# Patient Record
Sex: FEMALE | Race: WHITE | Employment: FULL TIME | URBAN - METROPOLITAN AREA
[De-identification: names, ages, dates, MRNs, and addresses within clinical notes are randomized per-mention and may not be internally consistent; named-entity substitution may affect disease eponyms.]

---

## 2019-02-26 ENCOUNTER — HOSPITAL ENCOUNTER (OUTPATIENT)
Age: 34
Discharge: HOME | End: 2019-02-26

## 2019-02-26 LAB — RUBV IGG SER QL: 453.4 IU/ML

## 2019-02-26 PROCEDURE — 86787 VARICELLA-ZOSTER ANTIBODY: CPT

## 2019-02-26 PROCEDURE — 86762 RUBELLA ANTIBODY: CPT

## 2019-02-26 PROCEDURE — 86481 TB AG RESPONSE T-CELL SUSP: CPT

## 2019-02-26 PROCEDURE — 86765 RUBEOLA ANTIBODY: CPT

## 2019-02-26 PROCEDURE — 86735 MUMPS ANTIBODY: CPT

## 2019-02-27 LAB
MEASLES IMMUNE (IGG): 2.64
MUV IGG SER QL: 2.58
VZV IGG SER QL IA: 4.54

## 2019-03-05 LAB — T-SPOT TB TEST: NORMAL

## 2019-04-11 ENCOUNTER — EMPLOYEE WELLNESS (OUTPATIENT)
Dept: OTHER | Age: 34
End: 2019-04-11

## 2019-04-11 LAB
CHOLESTEROL/HDL RATIO: 3.7
CHOLESTEROL: 171 MG/DL
ESTIMATED AVERAGE GLUCOSE: 94 MG/DL
GLUCOSE BLD-MCNC: 151 MG/DL (ref 70–99)
HBA1C MFR BLD: 4.9 % (ref 4–6)
HDLC SERPL-MCNC: 46 MG/DL
LDL CHOLESTEROL: 106 MG/DL (ref 0–130)
PATIENT FASTING?: NO
TRIGL SERPL-MCNC: 94 MG/DL
VLDLC SERPL CALC-MCNC: ABNORMAL MG/DL (ref 1–30)

## 2019-05-06 VITALS — WEIGHT: 157 LBS

## 2019-10-08 ENCOUNTER — OFFICE VISIT (OUTPATIENT)
Dept: FAMILY MEDICINE CLINIC | Age: 34
End: 2019-10-08
Payer: COMMERCIAL

## 2019-10-08 VITALS
HEART RATE: 91 BPM | HEIGHT: 64 IN | RESPIRATION RATE: 16 BRPM | SYSTOLIC BLOOD PRESSURE: 125 MMHG | BODY MASS INDEX: 28.48 KG/M2 | DIASTOLIC BLOOD PRESSURE: 79 MMHG | OXYGEN SATURATION: 99 % | WEIGHT: 166.8 LBS

## 2019-10-08 DIAGNOSIS — Z23 NEED FOR INFLUENZA VACCINATION: ICD-10-CM

## 2019-10-08 DIAGNOSIS — E55.9 VITAMIN D DEFICIENCY: ICD-10-CM

## 2019-10-08 DIAGNOSIS — Z00.00 WELL ADULT EXAM: Primary | ICD-10-CM

## 2019-10-08 DIAGNOSIS — Z98.84 HISTORY OF GASTRIC BYPASS: ICD-10-CM

## 2019-10-08 DIAGNOSIS — R41.840 CONCENTRATION DEFICIT: ICD-10-CM

## 2019-10-08 PROCEDURE — 90686 IIV4 VACC NO PRSV 0.5 ML IM: CPT | Performed by: FAMILY MEDICINE

## 2019-10-08 PROCEDURE — 99385 PREV VISIT NEW AGE 18-39: CPT | Performed by: FAMILY MEDICINE

## 2019-10-08 PROCEDURE — 90471 IMMUNIZATION ADMIN: CPT | Performed by: FAMILY MEDICINE

## 2019-10-08 RX ORDER — VILAZODONE HYDROCHLORIDE 40 MG/1
40 TABLET ORAL DAILY
COMMUNITY

## 2019-10-08 RX ORDER — OMEPRAZOLE 20 MG/1
40 CAPSULE, DELAYED RELEASE ORAL DAILY
COMMUNITY

## 2019-10-08 RX ORDER — BUPROPION HYDROCHLORIDE 100 MG/1
100 TABLET ORAL 2 TIMES DAILY
COMMUNITY
End: 2020-10-30 | Stop reason: DRUGHIGH

## 2019-10-08 RX ORDER — LORATADINE 10 MG/1
10 TABLET ORAL DAILY
COMMUNITY
End: 2020-03-06 | Stop reason: CLARIF

## 2019-10-08 ASSESSMENT — PATIENT HEALTH QUESTIONNAIRE - PHQ9
SUM OF ALL RESPONSES TO PHQ9 QUESTIONS 1 & 2: 0
2. FEELING DOWN, DEPRESSED OR HOPELESS: 0
SUM OF ALL RESPONSES TO PHQ QUESTIONS 1-9: 0
SUM OF ALL RESPONSES TO PHQ QUESTIONS 1-9: 0
1. LITTLE INTEREST OR PLEASURE IN DOING THINGS: 0

## 2019-11-04 ENCOUNTER — TELEPHONE (OUTPATIENT)
Dept: FAMILY MEDICINE CLINIC | Age: 34
End: 2019-11-04

## 2019-11-04 DIAGNOSIS — R30.0 DYSURIA: Primary | ICD-10-CM

## 2019-11-05 ENCOUNTER — HOSPITAL ENCOUNTER (OUTPATIENT)
Age: 34
Setting detail: SPECIMEN
Discharge: HOME | End: 2019-11-05
Payer: COMMERCIAL

## 2019-11-05 ENCOUNTER — HOSPITAL ENCOUNTER (OUTPATIENT)
Age: 34
Discharge: HOME | End: 2019-11-05
Payer: COMMERCIAL

## 2019-11-05 DIAGNOSIS — R30.0 DYSURIA: ICD-10-CM

## 2019-11-05 DIAGNOSIS — E55.9 VITAMIN D DEFICIENCY: ICD-10-CM

## 2019-11-05 DIAGNOSIS — Z00.00 WELL ADULT EXAM: ICD-10-CM

## 2019-11-05 DIAGNOSIS — Z98.84 HISTORY OF GASTRIC BYPASS: ICD-10-CM

## 2019-11-05 LAB
-: NORMAL
ABSOLUTE EOS #: 0.18 K/UL (ref 0–0.44)
ABSOLUTE IMMATURE GRANULOCYTE: <0.03 K/UL (ref 0–0.3)
ABSOLUTE LYMPH #: 2.5 K/UL (ref 1.1–3.7)
ABSOLUTE MONO #: 0.3 K/UL (ref 0.1–1.2)
ALBUMIN SERPL-MCNC: 4.4 G/DL (ref 3.5–5.2)
ALBUMIN/GLOBULIN RATIO: 1.6 (ref 1–2.5)
ALP BLD-CCNC: 96 U/L (ref 35–104)
ALT SERPL-CCNC: 17 U/L (ref 5–33)
AMORPHOUS: NORMAL
ANION GAP SERPL CALCULATED.3IONS-SCNC: 9 MMOL/L (ref 9–17)
AST SERPL-CCNC: 18 U/L
BACTERIA: NORMAL
BASOPHILS # BLD: 1 % (ref 0–2)
BASOPHILS ABSOLUTE: 0.03 K/UL (ref 0–0.2)
BILIRUB SERPL-MCNC: 0.21 MG/DL (ref 0.3–1.2)
BILIRUBIN URINE: NEGATIVE
BUN BLDV-MCNC: 11 MG/DL (ref 6–20)
BUN/CREAT BLD: ABNORMAL (ref 9–20)
CALCIUM IONIZED: 1.22 MMOL/L (ref 1.13–1.33)
CALCIUM SERPL-MCNC: 8.8 MG/DL (ref 8.6–10.4)
CASTS UA: NORMAL /LPF (ref 0–8)
CHLORIDE BLD-SCNC: 107 MMOL/L (ref 98–107)
CHOLESTEROL/HDL RATIO: 3
CHOLESTEROL: 169 MG/DL
CO2: 26 MMOL/L (ref 20–31)
COLOR: ABNORMAL
COMMENT UA: ABNORMAL
CREAT SERPL-MCNC: 0.65 MG/DL (ref 0.5–0.9)
CRYSTALS, UA: NORMAL /HPF
DIFFERENTIAL TYPE: ABNORMAL
EOSINOPHILS RELATIVE PERCENT: 4 % (ref 1–4)
EPITHELIAL CELLS UA: NORMAL /HPF (ref 0–5)
FOLATE: 12 NG/ML
GFR AFRICAN AMERICAN: >60 ML/MIN
GFR NON-AFRICAN AMERICAN: >60 ML/MIN
GFR SERPL CREATININE-BSD FRML MDRD: ABNORMAL ML/MIN/{1.73_M2}
GFR SERPL CREATININE-BSD FRML MDRD: ABNORMAL ML/MIN/{1.73_M2}
GLUCOSE BLD-MCNC: 85 MG/DL (ref 70–99)
GLUCOSE URINE: NEGATIVE
HCT VFR BLD CALC: 38.4 % (ref 36.3–47.1)
HDLC SERPL-MCNC: 56 MG/DL
HEMOGLOBIN: 12 G/DL (ref 11.9–15.1)
IMMATURE GRANULOCYTES: 0 %
IRON SATURATION: 9 % (ref 20–55)
IRON: 37 UG/DL (ref 37–145)
KETONES, URINE: NEGATIVE
LDL CHOLESTEROL: 97 MG/DL (ref 0–130)
LEUKOCYTE ESTERASE, URINE: NEGATIVE
LYMPHOCYTES # BLD: 48 % (ref 24–43)
MCH RBC QN AUTO: 26.2 PG (ref 25.2–33.5)
MCHC RBC AUTO-ENTMCNC: 31.3 G/DL (ref 28.4–34.8)
MCV RBC AUTO: 83.8 FL (ref 82.6–102.9)
MONOCYTES # BLD: 6 % (ref 3–12)
MUCUS: NORMAL
NITRITE, URINE: POSITIVE
NRBC AUTOMATED: 0 PER 100 WBC
OTHER OBSERVATIONS UA: NORMAL
PDW BLD-RTO: 12 % (ref 11.8–14.4)
PH UA: 7.5 (ref 5–8)
PHOSPHORUS: 3.3 MG/DL (ref 2.6–4.5)
PLATELET # BLD: 371 K/UL (ref 138–453)
PLATELET ESTIMATE: ABNORMAL
PMV BLD AUTO: 10.2 FL (ref 8.1–13.5)
POTASSIUM SERPL-SCNC: 3.8 MMOL/L (ref 3.7–5.3)
PROTEIN UA: NEGATIVE
RBC # BLD: 4.58 M/UL (ref 3.95–5.11)
RBC # BLD: ABNORMAL 10*6/UL
RBC UA: NORMAL /HPF (ref 0–4)
RENAL EPITHELIAL, UA: NORMAL /HPF
SEG NEUTROPHILS: 42 % (ref 36–65)
SEGMENTED NEUTROPHILS ABSOLUTE COUNT: 2.17 K/UL (ref 1.5–8.1)
SODIUM BLD-SCNC: 142 MMOL/L (ref 135–144)
SPECIFIC GRAVITY UA: 1.02 (ref 1–1.03)
THYROXINE, FREE: 1.02 NG/DL (ref 0.93–1.7)
TOTAL IRON BINDING CAPACITY: 399 UG/DL (ref 250–450)
TOTAL PROTEIN: 7.2 G/DL (ref 6.4–8.3)
TRICHOMONAS: NORMAL
TRIGL SERPL-MCNC: 78 MG/DL
TSH SERPL DL<=0.05 MIU/L-ACNC: 1.07 MIU/L (ref 0.3–5)
TURBIDITY: CLEAR
UNSATURATED IRON BINDING CAPACITY: 362 UG/DL (ref 112–347)
URINE HGB: NEGATIVE
UROBILINOGEN, URINE: NORMAL
VITAMIN B-12: 629 PG/ML (ref 232–1245)
VITAMIN D 25-HYDROXY: 18 NG/ML (ref 30–100)
VLDLC SERPL CALC-MCNC: NORMAL MG/DL (ref 1–30)
WBC # BLD: 5.2 K/UL (ref 3.5–11.3)
WBC # BLD: ABNORMAL 10*3/UL
WBC UA: NORMAL /HPF (ref 0–5)
YEAST: NORMAL

## 2019-11-05 PROCEDURE — 86376 MICROSOMAL ANTIBODY EACH: CPT

## 2019-11-05 PROCEDURE — 84443 ASSAY THYROID STIM HORMONE: CPT

## 2019-11-05 PROCEDURE — 82607 VITAMIN B-12: CPT

## 2019-11-05 PROCEDURE — 84439 ASSAY OF FREE THYROXINE: CPT

## 2019-11-05 PROCEDURE — 82746 ASSAY OF FOLIC ACID SERUM: CPT

## 2019-11-05 PROCEDURE — 80053 COMPREHEN METABOLIC PANEL: CPT

## 2019-11-05 PROCEDURE — 82306 VITAMIN D 25 HYDROXY: CPT

## 2019-11-05 PROCEDURE — 82330 ASSAY OF CALCIUM: CPT

## 2019-11-05 PROCEDURE — 83550 IRON BINDING TEST: CPT

## 2019-11-05 PROCEDURE — 36415 COLL VENOUS BLD VENIPUNCTURE: CPT

## 2019-11-05 PROCEDURE — 80061 LIPID PANEL: CPT

## 2019-11-05 PROCEDURE — 83540 ASSAY OF IRON: CPT

## 2019-11-05 PROCEDURE — 84425 ASSAY OF VITAMIN B-1: CPT

## 2019-11-05 PROCEDURE — 85025 COMPLETE CBC W/AUTO DIFF WBC: CPT

## 2019-11-05 PROCEDURE — 84100 ASSAY OF PHOSPHORUS: CPT

## 2019-11-06 LAB
CULTURE: NORMAL
Lab: NORMAL
SPECIMEN DESCRIPTION: NORMAL
THYROID PEROXIDASE (TPO) AB: 147 IU/ML (ref 0–35)

## 2019-11-08 LAB — VITAMIN B1 WHOLE BLOOD: 118 NMOL/L (ref 70–180)

## 2019-11-18 ENCOUNTER — HOSPITAL ENCOUNTER (EMERGENCY)
Age: 34
Discharge: HOME | End: 2019-11-18
Attending: EMERGENCY MEDICINE
Payer: COMMERCIAL

## 2019-11-18 VITALS
TEMPERATURE: 98.4 F | OXYGEN SATURATION: 100 % | HEART RATE: 94 BPM | SYSTOLIC BLOOD PRESSURE: 150 MMHG | RESPIRATION RATE: 18 BRPM | DIASTOLIC BLOOD PRESSURE: 108 MMHG

## 2019-11-18 DIAGNOSIS — Z77.21 EXPOSURE TO POTENTIALLY HAZARDOUS BODY FLUIDS: Primary | ICD-10-CM

## 2019-11-18 PROCEDURE — 99282 EMERGENCY DEPT VISIT SF MDM: CPT

## 2019-11-18 ASSESSMENT — ENCOUNTER SYMPTOMS
VOMITING: 0
NAUSEA: 0
SHORTNESS OF BREATH: 0
RHINORRHEA: 0
ABDOMINAL PAIN: 0
COUGH: 0
BACK PAIN: 0

## 2019-12-02 ENCOUNTER — TELEPHONE (OUTPATIENT)
Dept: FAMILY MEDICINE CLINIC | Age: 34
End: 2019-12-02

## 2019-12-02 NOTE — TELEPHONE ENCOUNTER
I called Dr. Augustus Bass they need a consent form signed by patient in order to release records. Patient will be here tomorrow to sign forms.

## 2020-01-09 ENCOUNTER — OFFICE VISIT (OUTPATIENT)
Dept: FAMILY MEDICINE CLINIC | Age: 35
End: 2020-01-09
Payer: COMMERCIAL

## 2020-01-09 ENCOUNTER — HOSPITAL ENCOUNTER (OUTPATIENT)
Age: 35
Setting detail: SPECIMEN
Discharge: HOME | End: 2020-01-09
Payer: COMMERCIAL

## 2020-01-09 ENCOUNTER — NURSE TRIAGE (OUTPATIENT)
Dept: OTHER | Facility: CLINIC | Age: 35
End: 2020-01-09

## 2020-01-09 VITALS
SYSTOLIC BLOOD PRESSURE: 160 MMHG | BODY MASS INDEX: 26.64 KG/M2 | DIASTOLIC BLOOD PRESSURE: 106 MMHG | RESPIRATION RATE: 16 BRPM | HEART RATE: 94 BPM | OXYGEN SATURATION: 100 % | WEIGHT: 155.2 LBS

## 2020-01-09 LAB
BILIRUBIN, POC: NORMAL
BLOOD URINE, POC: NORMAL
CLARITY, POC: CLEAR
COLOR, POC: YELLOW
GLUCOSE URINE, POC: NORMAL
KETONES, POC: NORMAL
LEUKOCYTE EST, POC: NORMAL
NITRITE, POC: NORMAL
PH, POC: 7
PROTEIN, POC: NORMAL
SPECIFIC GRAVITY, POC: 1.02
UROBILINOGEN, POC: 0.2

## 2020-01-09 PROCEDURE — 99213 OFFICE O/P EST LOW 20 MIN: CPT | Performed by: FAMILY MEDICINE

## 2020-01-09 PROCEDURE — 81003 URINALYSIS AUTO W/O SCOPE: CPT | Performed by: FAMILY MEDICINE

## 2020-01-09 RX ORDER — METOPROLOL SUCCINATE 50 MG/1
50 TABLET, EXTENDED RELEASE ORAL DAILY
Qty: 30 TABLET | Refills: 0 | Status: SHIPPED
Start: 2020-01-09 | End: 2020-03-06 | Stop reason: CLARIF

## 2020-01-09 ASSESSMENT — PATIENT HEALTH QUESTIONNAIRE - PHQ9
SUM OF ALL RESPONSES TO PHQ9 QUESTIONS 1 & 2: 0
1. LITTLE INTEREST OR PLEASURE IN DOING THINGS: 0
SUM OF ALL RESPONSES TO PHQ QUESTIONS 1-9: 0
SUM OF ALL RESPONSES TO PHQ QUESTIONS 1-9: 0
2. FEELING DOWN, DEPRESSED OR HOPELESS: 0

## 2020-01-09 NOTE — PROGRESS NOTES
Right Ear: External ear normal. Tympanic membrane is not erythematous. No middle ear effusion. Left Ear: External ear normal. Tympanic membrane is not erythematous. No middle ear effusion. Nose: No mucosal edema. Mouth/Throat: Oropharynx is clear and moist. No posterior oropharyngeal erythema. Eyes: Conjunctivae and EOM are normal. Pupils are equal, round, and reactive to light. Neck: Normal range of motion. Neck supple. No thyromegaly present. Cardiovascular: Normal rate, regular rhythm and normal heart sounds. No murmur heard. Pulmonary/Chest: Effort normal and breath sounds normal. She has no wheezes. Shehas no rales. Abdominal: Soft. Bowel sounds are normal. She exhibits no distension and no mass. There is no tenderness. There is no rebound and no guarding. Genitourinary/Anorectal:deferred  Musculoskeletal: Normal range of motion. She exhibits no edema or tenderness. Lymphadenopathy: She has no cervical adenopathy. Neurological: She is alert and oriented to person, place, and time. She has normal reflexes. Skin: Skin is warm and dry. No rash noted. Psychiatric: She has a normal mood and affect. Her   behavior is normal.       Assessment:      1. Dysuria    2. Essential hypertension          Plan:      Call or return to clinic prn if these symptoms worsen or fail to improve as anticipated. I have reviewed the instructions with the patient, answering all questions to her satisfaction. No follow-ups on file. Orders Placed This Encounter   Procedures    Urine Culture     Standing Status:   Future     Standing Expiration Date:   1/9/2021     Order Specific Question:   Specify (ex-cath, midstream, cysto, etc)?      Answer:   Mid Stream    POCT Urinalysis No Micro (Auto)     Orders Placed This Encounter   Medications    metoprolol succinate (TOPROL XL) 50 MG extended release tablet     Sig: Take 1 tablet by mouth daily     Dispense:  30 tablet     Refill:  0     I discussed with the patient that I cannot start her on a stimulant medication until her blood pressure is under control she would like to try monitoring it first.  She will need to follow-up in 2 weeks and must be under control before any medications will be started for ADD  I will send her urine for a culture    Electronically signed by Ronda Moore DO on 1/9/2020 at 9:43 AM

## 2020-01-10 LAB
CULTURE: NORMAL
Lab: NORMAL
SPECIMEN DESCRIPTION: NORMAL

## 2020-03-06 ENCOUNTER — OFFICE VISIT (OUTPATIENT)
Dept: FAMILY MEDICINE CLINIC | Age: 35
End: 2020-03-06
Payer: COMMERCIAL

## 2020-03-06 VITALS
HEART RATE: 90 BPM | SYSTOLIC BLOOD PRESSURE: 142 MMHG | DIASTOLIC BLOOD PRESSURE: 96 MMHG | WEIGHT: 152 LBS | BODY MASS INDEX: 26.09 KG/M2

## 2020-03-06 PROBLEM — G25.81 RESTLESS LEG SYNDROME: Status: ACTIVE | Noted: 2018-06-18

## 2020-03-06 PROBLEM — G44.209 TENSION TYPE HEADACHE: Status: ACTIVE | Noted: 2018-01-22

## 2020-03-06 PROBLEM — F41.1 GENERALIZED ANXIETY DISORDER: Status: ACTIVE | Noted: 2020-03-06

## 2020-03-06 PROBLEM — R87.619 ABNORMAL CERVICAL PAPANICOLAOU SMEAR: Status: ACTIVE | Noted: 2018-07-30

## 2020-03-06 PROBLEM — F33.1 MAJOR DEPRESSIVE DISORDER, RECURRENT, MODERATE (HCC): Status: ACTIVE | Noted: 2020-03-06

## 2020-03-06 PROBLEM — F90.2 ATTENTION DEFICIT HYPERACTIVITY DISORDER (ADHD), COMBINED TYPE: Status: ACTIVE | Noted: 2020-03-06

## 2020-03-06 PROBLEM — I49.9 ARRHYTHMIA: Status: ACTIVE | Noted: 2018-11-21

## 2020-03-06 PROBLEM — M54.2 NECK PAIN: Status: ACTIVE | Noted: 2017-03-16

## 2020-03-06 PROBLEM — F43.10 POST TRAUMATIC STRESS DISORDER (PTSD): Status: ACTIVE | Noted: 2018-05-25

## 2020-03-06 PROBLEM — K21.9 GASTROESOPHAGEAL REFLUX DISEASE: Status: ACTIVE | Noted: 2018-11-21

## 2020-03-06 PROCEDURE — 99204 OFFICE O/P NEW MOD 45 MIN: CPT | Performed by: FAMILY MEDICINE

## 2020-03-06 RX ORDER — DEXTROAMPHETAMINE SACCHARATE, AMPHETAMINE ASPARTATE MONOHYDRATE, DEXTROAMPHETAMINE SULFATE AND AMPHETAMINE SULFATE 2.5; 2.5; 2.5; 2.5 MG/1; MG/1; MG/1; MG/1
10 CAPSULE, EXTENDED RELEASE ORAL 2 TIMES DAILY
Qty: 60 CAPSULE | Refills: 0 | Status: SHIPPED
Start: 2020-03-06 | End: 2020-03-10 | Stop reason: ALTCHOICE

## 2020-03-06 ASSESSMENT — ENCOUNTER SYMPTOMS
COUGH: 0
ALLERGIC/IMMUNOLOGIC NEGATIVE: 1
EYES NEGATIVE: 1
DIARRHEA: 0
SHORTNESS OF BREATH: 0
CONSTIPATION: 0
BLOOD IN STOOL: 0
ABDOMINAL PAIN: 0

## 2020-03-06 NOTE — PROGRESS NOTES
[facility]  [facility]  [address]  [address]  [facility] [address]  Dept: [phone]    3/6/2020    CHIEF COMPLAINT    Chief Complaint   Patient presents with    New Patient       HPI    Jessika Burgess is a 30 y.o. female who presents   Chief Complaint   Patient presents with    New Patient   . New pt to get established. bp at home 134/82 average. Had been on bp meds prior to gastric surgery. bp was very low after surgery. Working night shift at [place] [address].. Goes to [facility] once a year for meds. Sees a psychiatrist once a year, counselor regularly. Hx of ADD/ADHD as a child and took ritalin and concerta. Had an evaluation recently which was positive for ADHD. 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 .       Vitals:    03/06/20 0955 03/06/20 1043   BP: (!) 142/92 (!) 142/96   Pulse: 90    Weight: 152 lb (68.9 kg)        REVIEW OF SYSTEMS    Review of Systems   Constitutional: Negative for fatigue, fever and unexpected weight change. HENT: Negative. Eyes: Negative. Respiratory: Negative for cough and shortness of breath. Cardiovascular: Negative for chest pain and leg swelling. Gastrointestinal: Negative for abdominal pain, blood in stool, constipation and diarrhea. Endocrine: Negative. Genitourinary: Negative for frequency and urgency. Musculoskeletal: Negative. Skin: Negative. Allergic/Immunologic: Negative. Neurological: Negative for dizziness and headaches. Hematological: Negative. Psychiatric/Behavioral: Negative for sleep disturbance. The patient is not nervous/anxious.         PAST MEDICAL HISTORY    Past Medical History:   Diagnosis Date    Attention deficit hyperactivity disorder (ADHD), combined type 3/6/2020    Depression     GERD (gastroesophageal reflux disease)     PTSD (post-traumatic stress disorder)     PTSD (post-traumatic stress disorder) (WELLBUTRIN) 100 MG tablet Take 100 mg by mouth 2 times daily      vilazodone HCl (VIIBRYD) 40 MG TABS Take 40 mg by mouth daily       No current facility-administered medications for this visit. ALLERGIES    Allergies   Allergen Reactions    Morphine Hives    Reglan [Metoclopramide] Hives       PHYSICAL EXAM   Physical Exam  Vitals signs reviewed. Constitutional:       Appearance: She is well-developed. HENT:      Head: Normocephalic. Eyes:      Pupils: Pupils are equal, round, and reactive to light. Neck:      Musculoskeletal: Normal range of motion and neck supple. Thyroid: No thyromegaly. Cardiovascular:      Rate and Rhythm: Normal rate and regular rhythm. Heart sounds: Normal heart sounds. No murmur. Pulmonary:      Effort: Pulmonary effort is normal.      Breath sounds: Normal breath sounds. No wheezing or rales. Abdominal:      Palpations: Abdomen is soft. Tenderness: There is no abdominal tenderness. There is no guarding or rebound. Musculoskeletal: Normal range of motion. General: No tenderness or deformity. Lymphadenopathy:      Cervical: No cervical adenopathy. Skin:     General: Skin is warm and dry. Neurological:      Mental Status: She is alert and oriented to person, place, and time. Psychiatric:         Behavior: Behavior normal.         Assessment/PLan  1. Attention deficit hyperactivity disorder (ADHD), combined type  Chronic, not treated currently. Start low dose adderal, increase if tolerated. Monitor bp while taking it. - amphetamine-dextroamphetamine (ADDERALL XR) 10 MG extended release capsule; Take 1 capsule by mouth 2 times daily for 30 days. Dispense: 60 capsule; Refill: 0    2. PTSD (post-traumatic stress disorder)  Chronic, cont meds and seeing counselor. 3. Generalized anxiety disorder  Cont meds which she gets from [place]. 4. Major depressive disorder, recurrent, moderate ([facility] [address]  As above. Chronic, stable.      5.

## 2020-03-10 ENCOUNTER — TELEPHONE (OUTPATIENT)
Dept: FAMILY MEDICINE CLINIC | Age: 35
End: 2020-03-10

## 2020-03-10 RX ORDER — DEXTROAMPHETAMINE SACCHARATE, AMPHETAMINE ASPARTATE MONOHYDRATE, DEXTROAMPHETAMINE SULFATE AND AMPHETAMINE SULFATE 5; 5; 5; 5 MG/1; MG/1; MG/1; MG/1
20 CAPSULE, EXTENDED RELEASE ORAL EVERY MORNING
Qty: 30 CAPSULE | Refills: 0 | Status: SHIPPED | OUTPATIENT
Start: 2020-03-10 | End: 2020-10-30 | Stop reason: SDUPTHER

## 2020-03-10 NOTE — TELEPHONE ENCOUNTER
Pt stated her pharmacy informed her insurance will not cover adderall 10 mg bid  Pt is asking if you would want to just send adderall 20 mg to pharmacy.

## 2020-10-30 ENCOUNTER — OFFICE VISIT (OUTPATIENT)
Dept: FAMILY MEDICINE CLINIC | Age: 35
End: 2020-10-30
Payer: COMMERCIAL

## 2020-10-30 VITALS
SYSTOLIC BLOOD PRESSURE: 120 MMHG | BODY MASS INDEX: 28.1 KG/M2 | TEMPERATURE: 98 F | OXYGEN SATURATION: 98 % | WEIGHT: 158.6 LBS | HEART RATE: 93 BPM | HEIGHT: 63 IN | DIASTOLIC BLOOD PRESSURE: 80 MMHG

## 2020-10-30 PROCEDURE — 99214 OFFICE O/P EST MOD 30 MIN: CPT | Performed by: FAMILY MEDICINE

## 2020-10-30 RX ORDER — DEXTROAMPHETAMINE SACCHARATE, AMPHETAMINE ASPARTATE MONOHYDRATE, DEXTROAMPHETAMINE SULFATE AND AMPHETAMINE SULFATE 5; 5; 5; 5 MG/1; MG/1; MG/1; MG/1
20 CAPSULE, EXTENDED RELEASE ORAL EVERY MORNING
Qty: 30 CAPSULE | Refills: 0 | Status: SHIPPED | OUTPATIENT
Start: 2020-10-30 | End: 2020-12-30 | Stop reason: SDUPTHER

## 2020-10-30 RX ORDER — BUPROPION HYDROCHLORIDE 100 MG/1
100 TABLET ORAL DAILY
Qty: 30 TABLET | Refills: 5 | Status: SHIPPED
Start: 2020-10-30

## 2020-10-30 ASSESSMENT — ENCOUNTER SYMPTOMS
COUGH: 0
ALLERGIC/IMMUNOLOGIC NEGATIVE: 1
SHORTNESS OF BREATH: 0
EYES NEGATIVE: 1
DIARRHEA: 0
ABDOMINAL PAIN: 0
CONSTIPATION: 0

## 2020-10-30 NOTE — PROGRESS NOTES
[facility]  [facility]  [address]  [facility] [address]  Dept: [phone]    10/30/2020    CHIEF COMPLAINT    Chief Complaint   Patient presents with    Medication Refill     wants to start new anti depressant, refill aderrall       TRACY    Melvin Gtz is a 37 y.o. female who presents   Chief Complaint   Patient presents with    Medication Refill     wants to start new anti depressant, refill aderrall   . Recheck chronic depression which has worsened since the death of her father a few weeks ago. He passed away in hospice within 24 hours with a diagnosis of leukemia, failed stem cell transplant. She does have a counselor that she talks to on regular basis. She has been getting her meds from the [place] but would like to add rexulti to her bupropion 100 mg and Viibryd 40 mg. She has not been sleeping well. She is not doing anything outside of the home except working. She has 2 daughters at home. She had been motivated to quit smoking but has not since her father passed. Is smoking about half a pack per day. Has had suicidal intention in the past and is aware of the potential risk. Her family is also aware and is supportive of her. Vitals:    10/30/20 0814   BP: 120/80   Site: Left Upper Arm   Position: Sitting   Cuff Size: Large Adult   Pulse: 93   Temp: 98 °F (36.7 °C)   TempSrc: Temporal   SpO2: 98%   Weight: 158 lb 9.6 oz (71.9 kg)   Height: 5' 3\" (1.6 m)       REVIEW OF SYSTEMS    Review of Systems   Constitutional: Positive for fatigue. Negative for fever and unexpected weight change. HENT: Negative. Eyes: Negative. Respiratory: Negative for cough and shortness of breath. Cardiovascular: Negative for chest pain and leg swelling. Gastrointestinal: Negative for abdominal pain, constipation and diarrhea. Endocrine: Negative. Genitourinary: Negative for frequency, menstrual problem and urgency. Musculoskeletal: Negative. Skin: Negative. Allergic/Immunologic: Negative. Neurological: Negative for dizziness and headaches. Hematological: Negative. Psychiatric/Behavioral: Positive for dysphoric mood and sleep disturbance. The patient is nervous/anxious.          See HPI       PAST MEDICAL HISTORY    Past Medical History:   Diagnosis Date    Attention deficit hyperactivity disorder (ADHD), combined type 3/6/2020    Depression     GERD (gastroesophageal reflux disease)     PTSD (post-traumatic stress disorder)     PTSD (post-traumatic stress disorder)        FAMILY HISTORY    Family History   Problem Relation Age of Onset    High Blood Pressure Father     High Cholesterol Father     Cancer Father 58        leukemia    Cancer Paternal Grandfather         CML       SOCIAL HISTORY    Social History     Socioeconomic History    Marital status:      Spouse name: None    Number of children: 2    Years of education: None    Highest education level: None   Occupational History    Occupation: RN-ER   Social Needs    Financial resource strain: None    Food insecurity     Worry: None     Inability: None    Transportation needs     Medical: None     Non-medical: None   Tobacco Use    Smoking status: Current Every Day Smoker     Packs/day: 0.50    Smokeless tobacco: Never Used   Substance and Sexual Activity    Alcohol use: Yes     Comment: occ    Drug use: Never    Sexual activity: Not Currently   Lifestyle    Physical activity     Days per week: None     Minutes per session: None    Stress: None   Relationships    Social connections     Talks on phone: None     Gets together: None     Attends Orthodoxy service: None     Active member of club or organization: None     Attends meetings of clubs or organizations: None     Relationship status: None    Intimate partner violence     Fear of current or ex partner: None     Emotionally abused: None     Physically abused: None     Forced sexual activity: None Other Topics Concern    None   Social History Narrative    None       SURGICAL HISTORY    Past Surgical History:   Procedure Laterality Date    BARIATRIC SURGERY N/A 2018    lost 65 pounds    CHOLECYSTECTOMY      WISDOM TOOTH EXTRACTION         CURRENT MEDICATIONS    Current Outpatient Medications   Medication Sig Dispense Refill    buPROPion (WELLBUTRIN) 100 MG tablet Take 1 tablet by mouth daily 30 tablet 5    brexpiprazole (REXULTI) 1 MG TABS tablet Take 1 tablet by mouth daily May increase to 2mg daily after one week 60 tablet 1    amphetamine-dextroamphetamine (ADDERALL XR) 20 MG extended release capsule Take 1 capsule by mouth every morning for 30 days. 30 capsule 0    omeprazole (PRILOSEC) 20 MG delayed release capsule Take 40 mg by mouth daily      vilazodone HCl (VIIBRYD) 40 MG TABS Take 40 mg by mouth daily       No current facility-administered medications for this visit. ALLERGIES    Allergies   Allergen Reactions    Morphine Hives    Reglan [Metoclopramide] Hives       PHYSICAL EXAM   Physical Exam  Vitals signs reviewed. Constitutional:       Appearance: She is well-developed and normal weight. HENT:      Head: Normocephalic. Eyes:      Pupils: Pupils are equal, round, and reactive to light. Neck:      Musculoskeletal: Normal range of motion and neck supple. Thyroid: No thyromegaly. Cardiovascular:      Rate and Rhythm: Normal rate and regular rhythm. Heart sounds: Normal heart sounds. No murmur. Pulmonary:      Effort: Pulmonary effort is normal.      Breath sounds: Normal breath sounds. No wheezing or rales. Abdominal:      Palpations: Abdomen is soft. Tenderness: There is no abdominal tenderness. There is no guarding or rebound. Musculoskeletal: Normal range of motion. General: No tenderness or deformity. Lymphadenopathy:      Cervical: No cervical adenopathy. Skin:     General: Skin is warm and dry.    Neurological:      Mental Status: She is alert and oriented to person, place, and time. Psychiatric:         Behavior: Behavior normal.         Thought Content: Thought content normal.         Judgment: Judgment normal.      Comments: Tearful         ASSESSMENT/PLAN  1. Major depressive disorder, recurrent, moderate (HCC)  Reviewed meds and agreed to add Rexulti 1 mg for a week and then may increase to 2 mg if tolerated. Patient has researched this medication on her own and is aware of potential side effects. Continue to talk to counselor on a regular basis. She says she has direct access to the counselors cell phone if she needs to talk to her. - brexpiprazole (REXULTI) 1 MG TABS tablet; Take 1 tablet by mouth daily May increase to 2mg daily after one week  Dispense: 60 tablet; Refill: 1  - CBC Auto Differential; Future  - Comprehensive Metabolic Panel; Future  - TSH without Reflex; Future  - T4, Free; Future  - Vitamin D 25 Hydroxy; Future    2. Attention deficit hyperactivity disorder (ADHD), combined type  She would like to restart Adderall which she takes only when she works 3 days a week. - amphetamine-dextroamphetamine (ADDERALL XR) 20 MG extended release capsule; Take 1 capsule by mouth every morning for 30 days. Dispense: 30 capsule; Refill: 0    3. Screening cholesterol level    - Lipid Panel; Future    4. Chronic fatigue  Check labs. Try to get adequate sleep. - CBC Auto Differential; Future  - Comprehensive Metabolic Panel; Future  - TSH without Reflex; Future  - Thyroid Antibodies; Future  - T4, Free; Future  - Vitamin D 25 Hydroxy; Future    5. Abnormal thyroid blood test  Prior abnormal thyroid antibodies. We will recheck.  - TSH without Reflex; Future  - Thyroid Antibodies; Future  - T4, Free; Future      Arabella received counseling on the following healthy behaviors: nutrition, exercise, medication adherence and tobacco cessation  Reviewed prior labs and health maintenance.   Continue current medications, diet and

## 2020-12-30 ENCOUNTER — TELEMEDICINE (OUTPATIENT)
Dept: FAMILY MEDICINE CLINIC | Age: 35
End: 2020-12-30
Payer: COMMERCIAL

## 2020-12-30 PROCEDURE — 99213 OFFICE O/P EST LOW 20 MIN: CPT | Performed by: FAMILY MEDICINE

## 2020-12-30 RX ORDER — DEXTROAMPHETAMINE SACCHARATE, AMPHETAMINE ASPARTATE MONOHYDRATE, DEXTROAMPHETAMINE SULFATE AND AMPHETAMINE SULFATE 5; 5; 5; 5 MG/1; MG/1; MG/1; MG/1
20 CAPSULE, EXTENDED RELEASE ORAL EVERY MORNING
Qty: 30 CAPSULE | Refills: 0 | Status: SHIPPED | OUTPATIENT
Start: 2020-12-30 | End: 2022-10-28 | Stop reason: SDUPTHER

## 2020-12-30 ASSESSMENT — ENCOUNTER SYMPTOMS
EYES NEGATIVE: 1
SHORTNESS OF BREATH: 0
COUGH: 0
ALLERGIC/IMMUNOLOGIC NEGATIVE: 1

## 2020-12-30 NOTE — PROGRESS NOTES
[facility]  [facility]  [address]  [facility] [address]  Dept: [phone]    2020    TELEHEALTH EVALUATION -- Audio/Visual (During BGPGX-00 public health emergency)  Joaquim Badillo (:  1985) has requested an audio/video evaluation for the following concern(s):    HPI:  Video visit to discuss depression and medication. Taking meds as prescribed. Seeing a counselor through telehealth. Was unable to get rexulti through her insurance. Still feels depressed but stable. Working in [place]. Has had first covid vaccine. Taking adderall on work days which is effective in controlling her ADD sx. There were no vitals filed for this visit. REVIEW OF SYSTEMS:   Review of Systems   Constitutional: Negative for fatigue, fever and unexpected weight change. HENT: Negative. Eyes: Negative. Respiratory: Negative for cough and shortness of breath. Cardiovascular: Negative for chest pain and leg swelling. Endocrine: Negative. Genitourinary: Negative for frequency and urgency. Musculoskeletal: Negative. Skin: Negative. Allergic/Immunologic: Negative. Neurological: Negative for dizziness and headaches. Hematological: Negative. Psychiatric/Behavioral: Positive for dysphoric mood. Negative for sleep disturbance. The patient is nervous/anxious.         PAST MEDICAL HISTORY:    Past Medical History:   Diagnosis Date    Attention deficit hyperactivity disorder (ADHD), combined type 3/6/2020    Depression     GERD (gastroesophageal reflux disease)     PTSD (post-traumatic stress disorder)     PTSD (post-traumatic stress disorder)        FAMILY HISTORY:    Family History   Problem Relation Age of Onset    High Blood Pressure Father     High Cholesterol Father     Cancer Father 58        leukemia    Cancer Paternal Grandfather         CML       SOCIAL HISTORY:    Social History     Socioeconomic History    Marital status:  Spouse name: None    Number of children: 2    Years of education: None    Highest education level: None   Occupational History    Occupation: RN-ER   Social Needs    Financial resource strain: None    Food insecurity     Worry: None     Inability: None    Transportation needs     Medical: None     Non-medical: None   Tobacco Use    Smoking status: Current Every Day Smoker     Packs/day: 0.50    Smokeless tobacco: Never Used   Substance and Sexual Activity    Alcohol use: Yes     Comment: occ    Drug use: Never    Sexual activity: Not Currently   Lifestyle    Physical activity     Days per week: None     Minutes per session: None    Stress: None   Relationships    Social connections     Talks on phone: None     Gets together: None     Attends Uatsdin service: None     Active member of club or organization: None     Attends meetings of clubs or organizations: None     Relationship status: None    Intimate partner violence     Fear of current or ex partner: None     Emotionally abused: None     Physically abused: None     Forced sexual activity: None   Other Topics Concern    None   Social History Narrative    None       SURGICAL HISTORY:    Past Surgical History:   Procedure Laterality Date    BARIATRIC SURGERY N/A 2018    lost 65 pounds    CHOLECYSTECTOMY      WISDOM TOOTH EXTRACTION         CURRENT MEDICATIONS:    Current Outpatient Medications   Medication Sig Dispense Refill    amphetamine-dextroamphetamine (ADDERALL XR) 20 MG extended release capsule Take 1 capsule by mouth every morning for 30 days. 30 capsule 0    buPROPion (WELLBUTRIN) 100 MG tablet Take 1 tablet by mouth daily (Patient taking differently: Take 100 mg by mouth 2 times daily ) 30 tablet 5    omeprazole (PRILOSEC) 20 MG delayed release capsule Take 40 mg by mouth daily      vilazodone HCl (VIIBRYD) 40 MG TABS Take 40 mg by mouth daily       No current facility-administered medications for this visit. ALLERGIES:   Allergies   Allergen Reactions    Morphine Hives    Reglan [Metoclopramide] Hives       PHYSICAL EXAM:   Physical Exam  Constitutional:       Appearance: Normal appearance. HENT:      Head: Normocephalic. Eyes:      Conjunctiva/sclera: Conjunctivae normal.   Neck:      Musculoskeletal: Normal range of motion. Pulmonary:      Effort: Pulmonary effort is normal.   Musculoskeletal: Normal range of motion. Skin:     Findings: No rash. Neurological:      General: No focal deficit present. Mental Status: She is alert and oriented to person, place, and time. Mental status is at baseline. Psychiatric:         Mood and Affect: Mood normal.         Behavior: Behavior normal.         Thought Content: Thought content normal.         Judgment: Judgment normal.          ASSESSMENT/PLAN:  1. Major depressive disorder, recurrent, moderate (HCC)  Chronic, stable. Cont med and counseling. 2. Generalized anxiety disorder  As above    3. Attention deficit hyperactivity disorder (ADHD), combined type  Chronic, stable on med. - amphetamine-dextroamphetamine (ADDERALL XR) 20 MG extended release capsule; Take 1 capsule by mouth every morning for 30 days. Dispense: 30 capsule; Refill: 0      Return in about 6 months (around 6/30/2021), or if symptoms worsen or fail to improve. Kirsten Roy is a 37 y.o. female being evaluated by a Virtual Visit (video visit) encounter to address concerns as mentioned above. A caregiver was present when appropriate. Due to this being a TeleHealth encounter (During MXBEA-55 public health emergency), evaluation of the following organ systems was limited: Vitals/Constitutional/EENT/Resp/CV/GI//MS/Neuro/Skin/Heme-Lymph-Imm.   Pursuant to the emergency declaration under the [facility] waiver authority and the [employer] Act, this Virtual Visit was conducted with patient's (and/or legal guardian's) consent, to reduce the patient's risk of exposure to COVID-19 and provide necessary medical care. The patient (and/or legal guardian) has also been advised to contact this office for worsening conditions or problems, and seek emergency medical treatment and/or call 911 if deemed necessary. Services were provided through a video synchronous discussion virtually to substitute for in-person clinic visit. Patient and provider were located at their individual homes. --Denise Cisse MD on 12/30/2020 at 1:03 PM    An electronic signature was used to authenticate this note.

## 2021-01-15 ENCOUNTER — TELEPHONE (OUTPATIENT)
Dept: FAMILY MEDICINE CLINIC | Age: 36
End: 2021-01-15

## 2021-01-15 DIAGNOSIS — L03.119 CELLULITIS OF UPPER EXTREMITY, UNSPECIFIED LATERALITY: Primary | ICD-10-CM

## 2021-01-15 RX ORDER — CEPHALEXIN 500 MG/1
500 CAPSULE ORAL 3 TIMES DAILY
Qty: 21 CAPSULE | Refills: 0 | Status: SHIPPED | OUTPATIENT
Start: 2021-01-15 | End: 2021-01-22

## 2021-01-15 RX ORDER — CEPHALEXIN 500 MG/1
500 CAPSULE ORAL 3 TIMES DAILY
Qty: 21 CAPSULE | Refills: 0 | Status: SHIPPED | OUTPATIENT
Start: 2021-01-15 | End: 2021-01-15 | Stop reason: SDUPTHER

## 2021-01-15 NOTE — TELEPHONE ENCOUNTER
Pt called stating had pimple on her right upper arm x 3 days. She popped it and next day got hard in the area and now it is has drainage and it is brownish and smells. Asking if she can have medication for treatment.     [facility] [address] [place] [address] [phone]

## 2021-08-10 ENCOUNTER — HOSPITAL ENCOUNTER (OUTPATIENT)
Age: 36
Discharge: HOME | End: 2021-08-10

## 2021-08-10 PROCEDURE — 36415 COLL VENOUS BLD VENIPUNCTURE: CPT

## 2021-08-10 PROCEDURE — 86481 TB AG RESPONSE T-CELL SUSP: CPT

## 2021-08-13 LAB — T-SPOT TB TEST: NORMAL

## 2022-08-20 ENCOUNTER — APPOINTMENT (OUTPATIENT)
Dept: GENERAL RADIOLOGY | Age: 37
End: 2022-08-20
Payer: COMMERCIAL

## 2022-08-20 ENCOUNTER — HOSPITAL ENCOUNTER (EMERGENCY)
Age: 37
Discharge: HOME | End: 2022-08-20
Attending: STUDENT IN AN ORGANIZED HEALTH CARE EDUCATION/TRAINING PROGRAM
Payer: COMMERCIAL

## 2022-08-20 VITALS
HEART RATE: 84 BPM | DIASTOLIC BLOOD PRESSURE: 76 MMHG | WEIGHT: 150 LBS | SYSTOLIC BLOOD PRESSURE: 118 MMHG | RESPIRATION RATE: 14 BRPM | OXYGEN SATURATION: 99 % | HEIGHT: 63 IN | TEMPERATURE: 98.2 F | BODY MASS INDEX: 26.58 KG/M2

## 2022-08-20 DIAGNOSIS — S90.31XA CONTUSION OF RIGHT FOOT, INITIAL ENCOUNTER: ICD-10-CM
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
## 2022-08-20 PROCEDURE — 99283 EMERGENCY DEPT VISIT LOW MDM: CPT

## 2022-08-20 PROCEDURE — 73630 X-RAY EXAM OF FOOT: CPT

## 2022-08-20 ASSESSMENT — ENCOUNTER SYMPTOMS
ABDOMINAL PAIN: 0
COLOR CHANGE: 0
EYE DISCHARGE: 0
SHORTNESS OF BREATH: 0
EYE REDNESS: 0

## 2022-08-20 ASSESSMENT — PAIN DESCRIPTION - ORIENTATION: ORIENTATION: RIGHT

## 2022-08-20 ASSESSMENT — PAIN - FUNCTIONAL ASSESSMENT: PAIN_FUNCTIONAL_ASSESSMENT: 0-10

## 2022-08-20 ASSESSMENT — PAIN DESCRIPTION - FREQUENCY: FREQUENCY: CONTINUOUS

## 2022-08-20 ASSESSMENT — PAIN DESCRIPTION - LOCATION: LOCATION: FOOT

## 2022-08-20 ASSESSMENT — PAIN DESCRIPTION - DESCRIPTORS: DESCRIPTORS: DISCOMFORT

## 2022-08-20 ASSESSMENT — PAIN DESCRIPTION - PAIN TYPE: TYPE: ACUTE PAIN

## 2022-08-20 ASSESSMENT — PAIN SCALES - GENERAL: PAINLEVEL_OUTOF10: 5
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
## 2022-10-28 ENCOUNTER — OFFICE VISIT (OUTPATIENT)
Dept: FAMILY MEDICINE CLINIC | Age: 37
End: 2022-10-28

## 2022-10-28 VITALS
HEART RATE: 80 BPM | BODY MASS INDEX: 27.74 KG/M2 | WEIGHT: 156.6 LBS | SYSTOLIC BLOOD PRESSURE: 120 MMHG | DIASTOLIC BLOOD PRESSURE: 80 MMHG
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
## 2022-10-28 DIAGNOSIS — F33.42 RECURRENT MAJOR DEPRESSIVE DISORDER, IN FULL REMISSION (HCC): ICD-10-CM

## 2022-10-28 DIAGNOSIS — M79.671 FOOT PAIN, RIGHT: Primary | ICD-10-CM

## 2022-10-28 DIAGNOSIS — F90.2 ATTENTION DEFICIT HYPERACTIVITY DISORDER (ADHD), COMBINED TYPE: ICD-10-CM

## 2022-10-28 PROBLEM — F32.A DEPRESSION: Status: ACTIVE | Noted: 2022-10-28

## 2022-10-28 PROCEDURE — 99213 OFFICE O/P EST LOW 20 MIN: CPT | Performed by: FAMILY MEDICINE

## 2022-10-28 RX ORDER — HYDROXYZINE PAMOATE 50 MG/1
50 CAPSULE ORAL 3 TIMES DAILY PRN
COMMUNITY

## 2022-10-28 RX ORDER — TRAZODONE HYDROCHLORIDE 100 MG/1
100 TABLET ORAL NIGHTLY
COMMUNITY

## 2022-10-28 RX ORDER — DEXTROAMPHETAMINE SACCHARATE, AMPHETAMINE ASPARTATE MONOHYDRATE, DEXTROAMPHETAMINE SULFATE AND AMPHETAMINE SULFATE 5; 5; 5; 5 MG/1; MG/1; MG/1; MG/1
20 CAPSULE, EXTENDED RELEASE ORAL EVERY MORNING
Qty: 30 CAPSULE | Refills: 0 | Status: SHIPPED | OUTPATIENT
Start: 2022-10-28 | End: 2022-11-27

## 2022-10-28 RX ORDER — ARIPIPRAZOLE 10 MG/1
5 TABLET ORAL
COMMUNITY
Start: 2022-10-06

## 2022-10-28 SDOH — ECONOMIC STABILITY: FOOD INSECURITY: WITHIN THE PAST 12 MONTHS, YOU WORRIED THAT YOUR FOOD WOULD RUN OUT BEFORE YOU GOT MONEY TO BUY MORE.: NEVER TRUE

## 2022-10-28 SDOH — ECONOMIC STABILITY: FOOD INSECURITY: WITHIN THE PAST 12 MONTHS, THE FOOD YOU BOUGHT JUST DIDN'T LAST AND YOU DIDN'T HAVE MONEY TO GET MORE.: NEVER TRUE

## 2022-10-28 ASSESSMENT — PATIENT HEALTH QUESTIONNAIRE - PHQ9
5. POOR APPETITE OR OVEREATING: 0
SUM OF ALL RESPONSES TO PHQ QUESTIONS 1-9: 2
9. THOUGHTS THAT YOU WOULD BE BETTER OFF DEAD, OR OF HURTING YOURSELF: 0
6. FEELING BAD ABOUT YOURSELF - OR THAT YOU ARE A FAILURE OR HAVE LET YOURSELF OR YOUR FAMILY DOWN: 0
7. TROUBLE CONCENTRATING ON THINGS, SUCH AS READING THE NEWSPAPER OR WATCHING TELEVISION: 0
SUM OF ALL RESPONSES TO PHQ QUESTIONS 1-9: 2
1. LITTLE INTEREST OR PLEASURE IN DOING THINGS: 0
3. TROUBLE FALLING OR STAYING ASLEEP: 1
10. IF YOU CHECKED OFF ANY PROBLEMS, HOW DIFFICULT HAVE THESE PROBLEMS MADE IT FOR YOU TO DO YOUR WORK, TAKE CARE OF THINGS AT HOME, OR GET ALONG WITH OTHER PEOPLE: 0
SUM OF ALL RESPONSES TO PHQ9 QUESTIONS 1 & 2: 0
SUM OF ALL RESPONSES TO PHQ QUESTIONS 1-9: 2
8. MOVING OR SPEAKING SO SLOWLY THAT OTHER PEOPLE COULD HAVE NOTICED. OR THE OPPOSITE, BEING SO FIGETY OR RESTLESS THAT YOU HAVE BEEN MOVING AROUND A LOT MORE THAN USUAL: 0
SUM OF ALL RESPONSES TO PHQ QUESTIONS 1-9: 2
4. FEELING TIRED OR HAVING LITTLE ENERGY: 1
2. FEELING DOWN, DEPRESSED OR HOPELESS: 0

## 2022-10-28 ASSESSMENT — ENCOUNTER SYMPTOMS
EYES NEGATIVE: 1
ALLERGIC/IMMUNOLOGIC NEGATIVE: 1

## 2022-10-28 ASSESSMENT — SOCIAL DETERMINANTS OF HEALTH (SDOH): HOW HARD IS IT FOR YOU TO PAY FOR THE VERY BASICS LIKE FOOD, HOUSING, MEDICAL CARE, AND HEATING?: NOT HARD AT ALL
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
## 2022-11-16 ENCOUNTER — HOSPITAL ENCOUNTER (OUTPATIENT)
Dept: MRI IMAGING | Age: 37
Discharge: HOME | End: 2022-11-18
Payer: COMMERCIAL

## 2022-11-16 DIAGNOSIS — M79.671 FOOT PAIN, RIGHT: ICD-10-CM
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
## 2022-11-16 PROCEDURE — 73718 MRI LOWER EXTREMITY W/O DYE: CPT

## 2022-11-23 ENCOUNTER — TELEPHONE (OUTPATIENT)
Dept: FAMILY MEDICINE CLINIC | Age: 37
End: 2022-11-23

## 2022-11-23 DIAGNOSIS — R93.89 ABNORMAL MRI: ICD-10-CM

## 2022-11-23 DIAGNOSIS — M79.671 FOOT PAIN, RIGHT: Primary | ICD-10-CM

## 2022-11-23 NOTE — TELEPHONE ENCOUNTER
Pt would like to know if she can get a CT order put in for her right foot by pcp rather than going back and forth to orthopedic specialist.    Please advise

## 2022-11-29 ENCOUNTER — HOSPITAL ENCOUNTER (OUTPATIENT)
Dept: CT IMAGING | Facility: CLINIC | Age: 37
Discharge: HOME | End: 2022-12-01
Payer: COMMERCIAL

## 2022-11-29 DIAGNOSIS — R93.89 ABNORMAL MRI: ICD-10-CM

## 2022-11-29 DIAGNOSIS — M79.671 FOOT PAIN, RIGHT: ICD-10-CM

## 2022-11-29 PROCEDURE — 73700 CT LOWER EXTREMITY W/O DYE: CPT

## 2022-12-01 ENCOUNTER — OFFICE VISIT (OUTPATIENT)
Dept: ORTHOPEDIC SURGERY | Age: 37
End: 2022-12-01

## 2022-12-01 VITALS — OXYGEN SATURATION: 100 % | HEIGHT: 63 IN | WEIGHT: 180 LBS | RESPIRATION RATE: 16 BRPM | BODY MASS INDEX: 31.89 KG/M2

## 2022-12-01 DIAGNOSIS — S93.324D DISLOCATION OF TARSOMETATARSAL JOINT OF RIGHT FOOT, SUBSEQUENT ENCOUNTER: Primary | ICD-10-CM

## 2022-12-01 DIAGNOSIS — T14.8XXA CONTUSION OF BONE: ICD-10-CM

## 2022-12-01 DIAGNOSIS — M79.671 RIGHT FOOT PAIN: Primary | ICD-10-CM

## 2022-12-01 DIAGNOSIS — M35.7 FOOT JOINT HYPERMOBILITY: ICD-10-CM

## 2022-12-01 RX ORDER — NAPROXEN 500 MG/1
500 TABLET ORAL 2 TIMES DAILY PRN
Qty: 60 TABLET | Refills: 0 | Status: SHIPPED | OUTPATIENT
Start: 2022-12-01
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
## 2022-12-14 ENCOUNTER — HOSPITAL ENCOUNTER (OUTPATIENT)
Dept: PHYSICAL THERAPY | Facility: CLINIC | Age: 37
Setting detail: THERAPIES SERIES
Discharge: HOME | End: 2022-12-14
Payer: COMMERCIAL

## 2022-12-14 PROCEDURE — 97161 PT EVAL LOW COMPLEX 20 MIN: CPT

## 2022-12-14 PROCEDURE — 97110 THERAPEUTIC EXERCISES: CPT
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
## 2022-12-22 ENCOUNTER — HOSPITAL ENCOUNTER (OUTPATIENT)
Dept: PHYSICAL THERAPY | Facility: CLINIC | Age: 37
Setting detail: THERAPIES SERIES
Discharge: HOME | End: 2022-12-22
Payer: COMMERCIAL

## 2023-01-05 ENCOUNTER — HOSPITAL ENCOUNTER (OUTPATIENT)
Dept: PHYSICAL THERAPY | Facility: CLINIC | Age: 38
Setting detail: THERAPIES SERIES
Discharge: HOME | End: 2023-01-05
Payer: COMMERCIAL

## 2023-01-05 PROCEDURE — 97110 THERAPEUTIC EXERCISES: CPT

## 2023-01-05 NOTE — FLOWSHEET NOTE
[x] [facility]  Outpatient Rehabilitation &  Therapy  [facility]   [place]: [phone]  F: [phone]      Physical Therapy Daily Treatment Note    Date:  2023  Patient Name:  Tavares Kaplan    :  1985  MRN: [id number]  Physician: Dr. Miller Bees: Burgess Urban ( vs HARD MAX)  Medical Diagnosis: Dislocation of tarsometatarsal joint of right foot, subsequent encounter (S93.324D [ICD-10-CM]); Contusion of bone (T14. 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 : 3/2/22  Visit# / total visits:    Cancels/No Shows: 0/0    Subjective:    Pain:  [x] Yes  [] No Location: R foot Pain Rating: (0-10 scale) 2/10  Pain altered Tx:  [x] No  [] Yes  Action:  Comments: Patient arrives stating she has no change in pain within midfoot. Continues to wear Hooka shoes but she feels her feet are over corrected into pronation. Objective:   Todays Treatment:  Modalities:   Precautions: Standard   Exercise       Reps/ Time Weight/ Level Comments             Bike                    Calf inversion stretch  3x30\"   HEP   Loaded soleus HR next               Standing shoe-off         Burrito gastroc stretch 3x30\"     Burrito soleus stretch next     Toe Yoga  x20      Foot doming  x20       Eccentric Calf Raises  next   If pt can keep foot neutral    Burrito gastroc stretch  3x30\" RLE HEP   Toe yoga 2x10 RLE    Foot doming 2x10 RLE    MOBO taps x20 Fins 1/3  Fins 2/4    MOBO pre-gait RLE x20 Fins 2/3    Standing toe raise off wall next           Other:     MFR proximal R gastroc   LAX mob      Specific Instructions for next treatment:       Treatment Charges: Mins Units   []  Modalities     [x]  Ther Exercise 30 2   []  Manual Therapy     []  Ther Activities     []  Aquatics     []  Vasocompression []  Other     Total Treatment time 30 2       Assessment: [x] Progressing toward goals. Focused on foot intrinsic muscle strength, patient with good foot corrections with visual cueing. Added foot doming and gastroc stretching to HEP. Educated patient on importance of performing stretches and foot intrinsic exercises. Patient plans to continue wearing tennis shoes as often as she can. Will continue to progress foot strength and stability next visit. [] No change. 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 ? Pain: Decrease pain levels to 3/10 with ADLs []  []  []      2. ? ROM: Increase R ankle DF AROM to at least 10 degrees to reduce difficulty with ADLs []  []  []      3. ? Strength: Increase R ankle MMT to 5/5 throughout to ease functional limitations and mobility  []  []  []      4. Independent with Home Exercise Programs []  []  []      5. SLS for at least 10 seconds without pain  []  []  []        []  []  []      Date Addressed:            LTG: To be met in 12 treatments           1. Improve score on assessment tool Lower Extremity Functional Scale (LEFS) from 16% impairment to less than 10% impairment  []  []  []      2.  Reduce pain levels to 0/10 or less with ADLs []  []  []        []  []  []                                  Patient goals: avoid surgery         Rehab Potential:  [x] Good  [] Fair  [] Poor   Suggested Professional Referral:  [x] No  [] Yes:  Barriers to Goal Achievement:  [x] No  [] Yes:  Domestic Concerns:  [x] No  [] Yes:    Pt. Education:  [x] Yes  [] No  [x] Reviewed Prior HEP/Ed  Method of Education: [x] Verbal  [] Demo  [x] Written    Access Code: [email or web address]  URL: [email or web address]  Date: 01/05/2023  Prepared by: Sukh Prdao    Exercises  Supine Hamstring Stretch with Strap - 1 x daily - 7 x weekly - 3 sets - 30 second hold  Gastroc Stretch on Wall - 1 x daily - 7 x weekly - 3 sets - 30 second hold  Arch Lifting - 1 x daily - 7 x weekly - 2 sets - 10 reps    Comprehension of Education:  [] Verbalizes understanding. [] Demonstrates understanding. [x] Needs review. [] Demonstrates/verbalizes HEP/Ed previously given. Plan: [x] Continue current frequency toward long and short term goals.           Time In: 5:00pm            Time Out: 5:40pm    Electronically signed by:  Sukh Prado PTA

## 2023-01-12 ENCOUNTER — TELEPHONE (OUTPATIENT)
Dept: ORTHOPEDIC SURGERY | Age: 38
End: 2023-01-12

## 2023-01-12 ENCOUNTER — HOSPITAL ENCOUNTER (OUTPATIENT)
Dept: PHYSICAL THERAPY | Facility: CLINIC | Age: 38
Setting detail: THERAPIES SERIES
Discharge: HOME | End: 2023-01-12
Payer: COMMERCIAL

## 2023-01-12 PROCEDURE — 97110 THERAPEUTIC EXERCISES: CPT

## 2023-01-12 PROCEDURE — 97140 MANUAL THERAPY 1/> REGIONS: CPT

## 2023-01-12 NOTE — FLOWSHEET NOTE
[x] [facility]  Outpatient Rehabilitation &  Therapy  [facility]   [place]: [phone]  F: [phone]      Physical Therapy Daily Treatment Note    Date:  2023  Patient Name:  Ryan Christopher    :  1985  MRN: [id number]  Physician: Dr. Tonja Stein: Maximiliano Machuca ( vs HARD MAX)  Medical Diagnosis: Dislocation of tarsometatarsal joint of right foot, subsequent encounter (S93.324D [ICD-10-CM]); Contusion of bone (T14. 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 : 3/2/22  Visit# / total visits: 3/20   Cancels/No Shows: 0/0    Subjective:    Pain:  [] Yes  [x] No Location: R foot Pain Rating: (0-10 scale) 0/10  Pain altered Tx:  [x] No  [] Yes  Action:  Comments: Patient arrives stating she feels her feet are getting more comfortable to the Hooka shoes. Decreased symptoms from last visit. Patient states the manual has helped. Patient reports she has had no pain after a 12 hour shift but her great toe throbs when she goes to bed at night. Overall, patient feels her symptoms have decreased since starting therapy. Objective:   Todays Treatment:  Modalities:   Precautions: Standard   Exercise       Reps/ Time Weight/ Level Comments             Bike   10'                 Calf inversion stretch  3x30\"   HEP             Standing shoe-off         Burrito gastroc stretch 3x30\" RLE HEP   Foot doming 2x10 RLE    MOBO taps 2x15 Fins 1/3  Fins 2/4    MOBO pre-gait RLE x20 Fins 2/3    SLS MOBO RLE 30\" Fins 1/3    SLS MOBO RLE trunk twist to the right  2x10 Fins 1/3 Focus on activating peroneals          Shoes-on      Standing toe raise off wall 2x10  Keep toes neutral    Eccentric calf raises 2x10 RLE    Split squat bench HR x10 RLE    SLS cones 2x RLE    Balance Board 3' L4          Other: MFR proximal R gastroc via hypervolt    Posterior talus glides R ankle      Specific Instructions for next treatment: monster walks-band around forefoot       Treatment Charges: Mins Units   []  Modalities     [x]  Ther Exercise 35 2   [x]  Manual Therapy 10 1   []  Ther Activities     []  Aquatics     []  Vasocompression     []  Other     Total Treatment time 45 3       Assessment: [x] Progressing toward goals. Continued with program, patient with significant improvement with balance and intrinsic foot strength resulting in the ability to self correct to a neutral foot. Patient plans to contact Dr. Doris Clark office to discuss necessity of obtaining custom orthotics that he suggested since patient is having no pain after a 12 hours shift and her symptoms have dramatically decreased since the start of therapy. Will continue to address manual if needed and progress foot intrinsic strength program.       [] No change. 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 ? Pain: Decrease pain levels to 3/10 with ADLs []  []  []      2. ? ROM: Increase R ankle DF AROM to at least 10 degrees to reduce difficulty with ADLs []  []  []      3. ? Strength: Increase R ankle MMT to 5/5 throughout to ease functional limitations and mobility  []  []  []      4. Independent with Home Exercise Programs []  []  []      5. SLS for at least 10 seconds without pain  []  []  []        []  []  []      Date Addressed:            LTG: To be met in 12 treatments           1. Improve score on assessment tool Lower Extremity Functional Scale (LEFS) from 16% impairment to less than 10% impairment  []  []  []      2.  Reduce pain levels to 0/10 or less with ADLs []  []  []        []  []  []                                  Patient goals: avoid surgery         Rehab Potential:  [x] Good  [] Fair  [] Poor   Suggested Professional Referral:  [x] No  [] Yes:  Barriers to Goal Achievement:  [x] No  [] Yes:  Domestic Concerns:  [x] No  [] Yes:    Pt. Education:  [x] Yes  [] No  [x] Reviewed Prior HEP/Ed  Method of Education: [x] Verbal  [] Demo  [x] Written    Access Code: [email or web address]  URL: [email or web address]  Date: 01/05/2023  Prepared by: Jeffy Garcia    Exercises  Supine Hamstring Stretch with Strap - 1 x daily - 7 x weekly - 3 sets - 30 second hold  Gastroc Stretch on Wall - 1 x daily - 7 x weekly - 3 sets - 30 second hold  Arch Lifting - 1 x daily - 7 x weekly - 2 sets - 10 reps    Comprehension of Education:  [] Verbalizes understanding. [] Demonstrates understanding. [] Needs review. [x] Demonstrates/verbalizes HEP/Ed previously given. Plan: [x] Continue current frequency toward long and short term goals.           Time In: 11:00am            Time Out: 11:55am    Electronically signed by:  Jeffy Garcia PTA

## 2023-01-12 NOTE — TELEPHONE ENCOUNTER
Pt of DR Hemanth Talavera - seen 12/1 as NEWPt Right foot - was given RX for PT, and suggested to get HOKA shoes, which she did and is doing - feeling much better. Also, was given RX for DME for custom Orthotic inserts. Pt was not able to get that appt until 1/27 for fitting. She is now curious since the PT and shoes seem to being working and giving her relief - does she still need to move forward with custom orthotic? She's concerned since its taken this long to get the fitting, and she's progressed well, will her progress be reduced getting used to the new orthotic? Please advise pt.   Thank youl  Respectfully/bb
Spoke with patient. Stated that if she is doing well with the shoes and PT, she can hold off on the orthotics if she'd like and continue to see how she does. She said she would prefer because it has taken over a week to get used to the shoes, and she just feels it will throw her off again and start from scratch. She has a follow up in March, and I told her that if her pain increases before this appointment, for her to know that our advise would be to then obtain the orthotics before coming back in to see Dr. Rylee Conn. This was she has tried every recommendation from him, and then he can advise her further if she continues to have any issues. She understood and had no further questions.
Cold/Sinus

## 2023-01-20 ENCOUNTER — HOSPITAL ENCOUNTER (OUTPATIENT)
Dept: PHYSICAL THERAPY | Facility: CLINIC | Age: 38
Setting detail: THERAPIES SERIES
Discharge: HOME | End: 2023-01-20
Payer: COMMERCIAL

## 2023-01-20 PROCEDURE — 97110 THERAPEUTIC EXERCISES: CPT

## 2023-01-20 PROCEDURE — 97140 MANUAL THERAPY 1/> REGIONS: CPT

## 2023-01-20 NOTE — FLOWSHEET NOTE
[x] [facility]  Outpatient Rehabilitation &  Therapy  [facility]   [place]: [phone]  F: [phone]      Physical Therapy Daily Treatment Note    Date:  2023  Patient Name:  Ailyn Marc    :  1985  MRN: [id number]  Physician: Dr. Klever Daniels: Yasmani Ojeda ( vs [employer])  Medical Diagnosis: Dislocation of tarsometatarsal joint of right foot, subsequent encounter (S93.324D [ICD-10-CM]); Contusion of bone (T14. 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 : 3/2/22  Visit# / total visits:    Cancels/No Shows: 0/0      Subjective:    Pain:  [] Yes  [x] No Location: R foot Pain Rating: (0-10 scale) 0/10  Pain altered Tx:  [x] No  [] Yes  Action:  Comments: Patient arrives today with no pain, mild 1st toe soreness still present but overall happy with her progression. Objective:     Todays Treatment:  Precautions: Standard   Exercise      RLE foot  Reps/ Time Weight/ Level Comments             Bike   10'                 Calf Slantboard stretch  30\"x3   HEP             Standing shoe-off            HEP         MOBO taps 2x15 Fins 1/3  Fins 2/4    MOBO pre-gait RLE x20 Fins 2/3    SLS MOBO RLE 30\" Fins 1/3    SLS MOBO RLE trunk twist to the right  2x10 Fins 1/3 Focus on activating peroneals          Shoes-on      Standing toe raise off wall 2x10  Keep toes neutral    Eccentric calf raises 2x10 RLE    Split squat bench HR 2x10 RLE    SLS cones x2 RLE    Balance Board 3' L4    SLS Rebounder  x20 bilat    Belt inv gastroc stretch 30\"x3                   Myofascial Restrictions  Location  R/L Treatment  Tools Notes    [x] Gastrocnemius [x] Right  [] Left [x] Direct  [] Indirect [] Hands-On  [] IASTM  [x] Hypervolt     [x] Soleus [x] Right  [] Left [x] Direct  [] Indirect [] Hands-On  [] IASTM  [x] Hypervolt     [] Other  [] Right  [] Left [] Direct  [] Indirect [] Hands-On  [] IASTM  [] Hypervolt               Direct or Indirect release  IASTM= Instrument assisted soft tissue mobilization        Treatment Charges: Mins Units   []  Modalities     [x]  Ther Exercise 38 2   [x]  Manual Therapy 10 1   []  Ther Activities     []  Aquatics     []  Vasocompression     []  Other     Total Treatment time 48 3       Assessment: [x] Progressing toward goals. Therex as per flow sheet with focus on balance and proprioceptive strengthening. Patient needed cues for technique, occasional UE support for balance. Reviewed HEPwith good understanding. Patient is planning to hold off on custom inserts at this time. [] No change. 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 ? Pain: Decrease pain levels to 3/10 with ADLs []  []  []      2. ? ROM: Increase R ankle DF AROM to at least 10 degrees to reduce difficulty with ADLs []  []  []      3. ? Strength: Increase R ankle MMT to 5/5 throughout to ease functional limitations and mobility  []  []  []      4. Independent with Home Exercise Programs []  []  []      5. SLS for at least 10 seconds without pain  []  []  []        []  []  []      Date Addressed:            LTG: To be met in 12 treatments           1. Improve score on assessment tool Lower Extremity Functional Scale (LEFS) from 16% impairment to less than 10% impairment  []  []  []      2.  Reduce pain levels to 0/10 or less with ADLs []  []  []        []  []  []                                  Patient goals: avoid surgery         Rehab Potential:  [x] Good  [] Fair  [] Poor   Suggested Professional Referral:  [x] No  [] Yes:  Barriers to Goal Achievement:  [x] No  [] Yes:  Domestic Concerns:  [x] No  [] Yes:    Pt. Education:  [x] Yes  [] No  [x] Reviewed Prior HEP/Ed  Method of Education: [x] Verbal  [] Demo  [x] Written    Access Code: [email or web address]  URL: [email or web address]  Date: 01/05/2023  Prepared by: Karey Back    Exercises  Supine Hamstring Stretch with Strap - 1 x daily - 7 x weekly - 3 sets - 30 second hold  Gastroc Stretch on Wall - 1 x daily - 7 x weekly - 3 sets - 30 second hold  Arch Lifting - 1 x daily - 7 x weekly - 2 sets - 10 reps    Comprehension of Education:  [] Verbalizes understanding. [] Demonstrates understanding. [] Needs review. [x] Demonstrates/verbalizes HEP/Ed previously given. Plan: [x] Continue current frequency toward long and short term goals.           Time In: 1050            Time Out: 1150    Electronically signed by:  Guerrero Porter PT

## 2023-01-25 ENCOUNTER — HOSPITAL ENCOUNTER (OUTPATIENT)
Dept: PHYSICAL THERAPY | Facility: CLINIC | Age: 38
Setting detail: THERAPIES SERIES
Discharge: HOME | End: 2023-01-25
Payer: COMMERCIAL

## 2023-01-25 PROCEDURE — 97110 THERAPEUTIC EXERCISES: CPT

## 2023-01-25 NOTE — FLOWSHEET NOTE
[x] [facility]  Outpatient Rehabilitation &  Therapy  [facility]   [place]: [phone]  F: [phone]      Physical Therapy Daily Treatment Note    Date:  2023  Patient Name:  Sugar Melissa    :  1985  MRN: [id number]  Physician: Dr. Mars Hang: Jessica Mood ( vs [employer])  Medical Diagnosis: Dislocation of tarsometatarsal joint of right foot, subsequent encounter (S93.324D [ICD-10-CM]); Contusion of bone (T14. 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 : 3/2/22  Visit# / total visits:    Cancels/No Shows: 0/0    Subjective:    Pain:  [] Yes  [x] No Location: R foot Pain Rating: (0-10 scale) 0/10  Pain altered Tx:  [x] No  [] Yes  Action:  Comments: Patient arrives today with no pain and states she would like to finish this week and be discharged due to significantly improved symptoms. Patient states the only issue she has is her toe throbs after working a long 12 hours shift. Patient is able to work a 12 hours shift without pain wearing her Hooka shoes. Patient reports she talked to Dr. Nicholas Lyn office and received confirmation that she does not need custom inserts if she feels she does not need them. Patient states she is not going to purchase the inserts due to improved pain since beginning PT. Patient reports she purchased a massage gun to decrease the tightness in gastroc. No great toe pain with extension. Objective:   Todays Treatment:  Precautions: Standard   Exercise      RLE foot  Reps/ Time Weight/ Level Comments             Bike   10'                 Calf Slantboard stretch  30\"x3   HEP   HS stool stretch  30\"x3             Standing shoe-off         SLS MOBO taps 2x15 Fins 1/3  Fins 2/4    SLS MOBO RLE trunk twist to the right  x10 Fins 1/3  orange Focus on activating peroneals          Shoes-on      Standing toe raise off wall 2x10  Keep toes neutral    Eccentric calf raises 2x10 RLE    Split squat bench HR 2x10 RLE    SLS cones x2 RLE    Balance Board 5' L4    SLS Rebounder  x20 bilat    Belt inv gastroc stretch 30\"x3                   Myofascial Restrictions  Location  R/L Treatment  Tools Notes    [x] Gastrocnemius [x] Right  [] Left [x] Direct  [] Indirect [] Hands-On  [] IASTM  [x] Hypervolt     [x] Soleus [x] Right  [] Left [x] Direct  [] Indirect [] Hands-On  [] IASTM  [x] Hypervolt     [] Other  [] Right  [] Left [] Direct  [] Indirect [] Hands-On  [] IASTM  [] Hypervolt               Direct or Indirect release  IASTM= Instrument assisted soft tissue mobilization        Treatment Charges: Mins Units   []  Modalities     [x]  Ther Exercise 35 2   []  Manual Therapy     []  Ther Activities     []  Aquatics     []  Vasocompression     []  Other     Total Treatment time 35 2       Assessment: [x] Progressing toward goals. Patient with good form and able to maintain foot neutral. Patient plans to finish PT this week and complete HEP independently.         [] No change.     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 ? Pain: Decrease pain levels to 3/10 with ADLs []  []  []      2. ? ROM: Increase R ankle DF AROM to at least 10 degrees to reduce difficulty with ADLs []  []  []      3. ? Strength: Increase R ankle MMT to 5/5 throughout to ease functional limitations and mobility  []  []  []      4. Independent with Home Exercise Programs []  []  []      5. SLS for at least 10 seconds without pain  []  []  []        []  []  []      Date Addressed:            LTG: To be met in  12 treatments           1. Improve score on assessment tool Lower Extremity Functional Scale (LEFS) from 16% impairment to less than 10% impairment  []  []  []      2. Reduce pain levels to 0/10 or less with ADLs []  []  []        []  []  []                                  Patient goals: avoid surgery         Rehab Potential:  [x] Good  [] Fair  [] Poor   Suggested Professional Referral:  [x] No  [] Yes:  Barriers to Goal Achievement:  [x] No  [] Yes:  Domestic Concerns:  [x] No  [] Yes:    Pt. Education:  [x] Yes  [] No  [x] Reviewed Prior HEP/Ed  Method of Education: [x] Verbal  [] Demo  [x] Written    Access Code: [email or web address]  URL: [email or web address]  Date: 01/05/2023  Prepared by: Jayne Ortiz    Exercises  Supine Hamstring Stretch with Strap - 1 x daily - 7 x weekly - 3 sets - 30 second hold  Gastroc Stretch on Wall - 1 x daily - 7 x weekly - 3 sets - 30 second hold  Arch Lifting - 1 x daily - 7 x weekly - 2 sets - 10 reps    Comprehension of Education:  [] Verbalizes understanding.  [] Demonstrates understanding.  [] Needs review.  [x] Demonstrates/verbalizes HEP/Ed previously given.     Plan: [x] Continue current frequency toward long and short term goals.          Time In: 10:55am          Time Out: 11:40am    Electronically signed by:  Jayne Ortiz PTA

## 2023-01-27 ENCOUNTER — HOSPITAL ENCOUNTER (OUTPATIENT)
Dept: PHYSICAL THERAPY | Facility: CLINIC | Age: 38
Setting detail: THERAPIES SERIES
Discharge: HOME | End: 2023-01-27
Payer: COMMERCIAL

## 2023-01-27 PROCEDURE — 97110 THERAPEUTIC EXERCISES: CPT

## 2023-01-27 NOTE — FLOWSHEET NOTE
[x] [facility]  Outpatient Rehabilitation &  Therapy  [facility]   [place]: [phone]  F: [phone]      Physical Therapy Daily Treatment Note    Date:  2023  Patient Name:  Deja Lebron    :  1985  MRN: [id number]  Physician: Dr. Colleen Daniels: Landon Brandt ( vs [employer])  Medical Diagnosis: Dislocation of tarsometatarsal joint of right foot, subsequent encounter (S93.324D [ICD-10-CM]); Contusion of bone (T14. 8XXA [ICD-10-CM]); Foot joint hypermobility (M35.7 [ICD-10-CM])                          Rehab Codes: M62.81, R26.89, M79.671, M25.671  Onset date: 22                             Next Dr's appt. : 3/2/22  Visit# / total visits:    Cancels/No Shows: 0/0    Subjective:    Pain:  [] Yes  [x] No Location: R foot Pain Rating: (0-10 scale) 0/10  Pain altered Tx:  [x] No  [] Yes  Action:  Comments: Patient arrives today stating continued improvements and would like to finish PT today and continue HEP independently. Continues to use massage gun at home. Objective: Todays Treatment:  Precautions: Standard   Exercise      RLE foot  Reps/ Time Weight/ Level Comments             Bike   10'                 Calf Slantboard stretch  30\"x3   HEP   HS stool stretch  30\"x3       Step gastroc stretch  30\"x3  HEP         Standing shoe-off         SLS runk twist to the right  x10 orange Focus on activating peroneals          Shoes-on      Standing toe raise off wall 2x10  Keep toes neutral    Eccentric calf raises x10 RLE HEP   Split squat HR x10 RLE HEP   SLS deadlift x10 RLE HEP                         Treatment Charges: Mins Units   []  Modalities     [x]  Ther Exercise 13 1   []  Manual Therapy     []  Ther Activities     []  Aquatics     []  Vasocompression     []  Other     Total Treatment time 13 1       Assessment: [x] Progressing toward goals.  Reviewed all HEP to focus on at home and provided patient with step gastroc stretch to add to program. Patient demonstrates good form with all exercises and has no pain with heel raises or single leg balance. Administered and orange resistance band to begin SLS trunk rotation to simulate the [address] board at home and advised patient to stand on a pillow for an uneven surface. Patient will continue to use her massage gun on gastroc to minimize muscle tension. Patient will be discharged this date. [] No change. 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 ? Pain: Decrease pain levels to 3/10 with ADLs []  []  []  0/10 pain with ADL's  Able to walk a 12 hour shift without pain    2. ? ROM: Increase R ankle DF AROM to at least 10 degrees to reduce difficulty with ADLs []  []  []      3. ? Strength: Increase R ankle MMT to 5/5 throughout to ease functional limitations and mobility  []  []  []  Improved strength since initial evaluation   4. Independent with Home Exercise Programs []  []  []  Compliant with HEP  Demonstrates good form    5. SLS for at least 10 seconds without pain  []  []  []  Able to hold 10 second without pain       []  []  []      Date Addressed:            LTG: To be met in 12 treatments           1.  Improve score on assessment tool Lower Extremity Functional Scale (LEFS) from 16% impairment to less than 10% impairment  []  []  []  8% impaired     2. Reduce pain levels to 0/10 or less with ADLs []  []  []  0/10 pain       []  []  []                                  Patient goals: avoid surgery         Rehab Potential:  [x] Good  [] Fair  [] Poor   Suggested Professional Referral:  [x] No  [] Yes:  Barriers to Goal Achievement:  [x] No  [] Yes:  Domestic Concerns:  [x] No [] Yes:    Pt. Education:  [x] Yes  [] No  [x] Reviewed Prior HEP/Ed, administered orange band, reviewed HEP to focus on foot intrinsic strength. Method of Education: [x] Verbal  [x] Demo  [] Written    Access Code: [email or web address]  URL: [email or web address]  Date: 01/05/2023  Prepared by: Vanessa Lee    Exercises  Supine Hamstring Stretch with Strap - 1 x daily - 7 x weekly - 3 sets - 30 second hold  Gastroc Stretch on Wall - 1 x daily - 7 x weekly - 3 sets - 30 second hold  Arch Lifting - 1 x daily - 7 x weekly - 2 sets - 10 reps    Comprehension of Education:  [x] Verbalizes understanding. [x] Demonstrates understanding. [] Needs review. [x] Demonstrates/verbalizes HEP/Ed previously given. Plan: [x] Discharge this date.          Time In: 10:57am          Time Out: 11:20am    Electronically signed by:  Vanessa Lee PTA

## 2023-03-01 DIAGNOSIS — S93.324D DISLOCATION OF TARSOMETATARSAL JOINT OF RIGHT FOOT, SUBSEQUENT ENCOUNTER: Primary | ICD-10-CM

## 2023-03-09 ENCOUNTER — OFFICE VISIT (OUTPATIENT)
Dept: ORTHOPEDIC SURGERY | Age: 38
End: 2023-03-09

## 2023-03-09 VITALS — RESPIRATION RATE: 16 BRPM | OXYGEN SATURATION: 100 % | BODY MASS INDEX: 31.89 KG/M2 | HEIGHT: 63 IN | WEIGHT: 180 LBS

## 2023-03-09 DIAGNOSIS — M35.7 FOOT JOINT HYPERMOBILITY: ICD-10-CM

## 2023-03-09 DIAGNOSIS — T14.8XXA CONTUSION OF BONE: ICD-10-CM

## 2023-03-09 DIAGNOSIS — S93.324D DISLOCATION OF TARSOMETATARSAL JOINT OF RIGHT FOOT, SUBSEQUENT ENCOUNTER: Primary | ICD-10-CM
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
## 2023-03-20 DIAGNOSIS — F90.2 ATTENTION DEFICIT HYPERACTIVITY DISORDER (ADHD), COMBINED TYPE: ICD-10-CM

## 2023-03-20 RX ORDER — DEXTROAMPHETAMINE SACCHARATE, AMPHETAMINE ASPARTATE MONOHYDRATE, DEXTROAMPHETAMINE SULFATE AND AMPHETAMINE SULFATE 5; 5; 5; 5 MG/1; MG/1; MG/1; MG/1
20 CAPSULE, EXTENDED RELEASE ORAL EVERY MORNING
Qty: 30 CAPSULE | Refills: 0 | Status: SHIPPED | OUTPATIENT
Start: 2023-03-20 | End: 2023-04-19

## 2023-03-20 NOTE — TELEPHONE ENCOUNTER
Lucille Rushing is calling to request a refill on the following medication(s):    Last Visit Date (If Applicable):  51/83/9974    Next Visit Date:    Visit date not found    Medication Request:  Requested Prescriptions     Pending Prescriptions Disp Refills    amphetamine-dextroamphetamine (ADDERALL XR) 20 MG extended release capsule 30 capsule 0     Sig: Take 1 capsule by mouth every morning for 30 days.

## 2025-04-28 ENCOUNTER — APPOINTMENT (OUTPATIENT)
Dept: GENERAL RADIOLOGY | Age: 40
End: 2025-04-28

## 2025-04-28 ENCOUNTER — HOSPITAL ENCOUNTER (EMERGENCY)
Age: 40
Discharge: HOME | End: 2025-04-28
Attending: EMERGENCY MEDICINE

## 2025-04-28 VITALS
HEIGHT: 63 IN | DIASTOLIC BLOOD PRESSURE: 86 MMHG | TEMPERATURE: 98.2 F | HEART RATE: 94 BPM | WEIGHT: 160 LBS | OXYGEN SATURATION: 99 % | SYSTOLIC BLOOD PRESSURE: 112 MMHG | BODY MASS INDEX: 28.35 KG/M2 | RESPIRATION RATE: 14 BRPM

## 2025-04-28 DIAGNOSIS — I47.10 PAROXYSMAL SUPRAVENTRICULAR TACHYCARDIA: Primary | ICD-10-CM

## 2025-04-28 LAB
ANION GAP SERPL CALCULATED.3IONS-SCNC: 9 MMOL/L (ref 9–16)
BASOPHILS # BLD: 0.04 K/UL (ref 0–0.2)
BASOPHILS NFR BLD: 1 % (ref 0–2)
BUN SERPL-MCNC: 12 MG/DL (ref 6–20)
CALCIUM SERPL-MCNC: 8.3 MG/DL (ref 8.6–10.4)
CHLORIDE SERPL-SCNC: 107 MMOL/L (ref 98–107)
CO2 SERPL-SCNC: 22 MMOL/L (ref 20–31)
CREAT SERPL-MCNC: 1 MG/DL (ref 0.5–0.9)
EOSINOPHIL # BLD: 0.09 K/UL (ref 0–0.44)
EOSINOPHILS RELATIVE PERCENT: 2 % (ref 1–4)
ERYTHROCYTE [DISTWIDTH] IN BLOOD BY AUTOMATED COUNT: 12.6 % (ref 11.8–14.4)
GFR, ESTIMATED: 72 ML/MIN/1.73M2
GLUCOSE SERPL-MCNC: 188 MG/DL (ref 74–99)
HCT VFR BLD AUTO: 35.1 % (ref 36.3–47.1)
HGB BLD-MCNC: 12.1 G/DL (ref 11.9–15.1)
IMM GRANULOCYTES # BLD AUTO: 0 K/UL (ref 0–0.3)
IMM GRANULOCYTES NFR BLD: 0 %
LYMPHOCYTES NFR BLD: 1.93 K/UL (ref 1.1–3.7)
LYMPHOCYTES RELATIVE PERCENT: 43 % (ref 24–43)
MAGNESIUM SERPL-MCNC: 2.1 MG/DL (ref 1.6–2.6)
MCH RBC QN AUTO: 30.3 PG (ref 25.2–33.5)
MCHC RBC AUTO-ENTMCNC: 34.5 G/DL (ref 28.4–34.8)
MCV RBC AUTO: 87.8 FL (ref 82.6–102.9)
MONOCYTES NFR BLD: 0.25 K/UL (ref 0.1–1.2)
MONOCYTES NFR BLD: 6 % (ref 3–12)
NEUTROPHILS NFR BLD: 48 % (ref 36–65)
NEUTS SEG NFR BLD: 2.15 K/UL (ref 1.5–8.1)
NRBC BLD-RTO: 0 PER 100 WBC
PLATELET # BLD AUTO: 244 K/UL (ref 138–453)
PMV BLD AUTO: 9 FL (ref 8.1–13.5)
POTASSIUM SERPL-SCNC: 3.5 MMOL/L (ref 3.7–5.3)
RBC # BLD AUTO: 4 M/UL (ref 3.95–5.11)
SODIUM SERPL-SCNC: 138 MMOL/L (ref 136–145)
WBC OTHER # BLD: 4.5 K/UL (ref 3.5–11.3)

## 2025-04-28 PROCEDURE — 6370000000 HC RX 637 (ALT 250 FOR IP): Performed by: EMERGENCY MEDICINE

## 2025-04-28 PROCEDURE — 83735 ASSAY OF MAGNESIUM: CPT

## 2025-04-28 PROCEDURE — 85025 COMPLETE CBC W/AUTO DIFF WBC: CPT

## 2025-04-28 PROCEDURE — 71045 X-RAY EXAM CHEST 1 VIEW: CPT

## 2025-04-28 PROCEDURE — 96360 HYDRATION IV INFUSION INIT: CPT

## 2025-04-28 PROCEDURE — 99285 EMERGENCY DEPT VISIT HI MDM: CPT

## 2025-04-28 PROCEDURE — 2580000003 HC RX 258: Performed by: EMERGENCY MEDICINE

## 2025-04-28 PROCEDURE — 93005 ELECTROCARDIOGRAM TRACING: CPT | Performed by: EMERGENCY MEDICINE

## 2025-04-28 PROCEDURE — 96361 HYDRATE IV INFUSION ADD-ON: CPT

## 2025-04-28 PROCEDURE — 80048 BASIC METABOLIC PNL TOTAL CA: CPT

## 2025-04-28 RX ORDER — 0.9 % SODIUM CHLORIDE 0.9 %
1000 INTRAVENOUS SOLUTION INTRAVENOUS ONCE
Status: COMPLETED | OUTPATIENT
Start: 2025-04-28 | End: 2025-04-28

## 2025-04-28 RX ORDER — POTASSIUM CHLORIDE 1500 MG/1
40 TABLET, EXTENDED RELEASE ORAL ONCE
Status: COMPLETED | OUTPATIENT
Start: 2025-04-28 | End: 2025-04-28

## 2025-04-28 RX ADMIN — POTASSIUM CHLORIDE 40 MEQ: 20 TABLET, EXTENDED RELEASE ORAL at 08:49

## 2025-04-28 RX ADMIN — SODIUM CHLORIDE 1000 ML: 0.9 INJECTION, SOLUTION INTRAVENOUS at 08:00

## 2025-04-28 RX ADMIN — SODIUM CHLORIDE 1000 ML: 0.9 INJECTION, SOLUTION INTRAVENOUS at 08:52

## 2025-04-28 ASSESSMENT — PAIN - FUNCTIONAL ASSESSMENT: PAIN_FUNCTIONAL_ASSESSMENT: NONE - DENIES PAIN

## 2025-04-28 ASSESSMENT — LIFESTYLE VARIABLES
HOW MANY STANDARD DRINKS CONTAINING ALCOHOL DO YOU HAVE ON A TYPICAL DAY: PATIENT DOES NOT DRINK
HOW OFTEN DO YOU HAVE A DRINK CONTAINING ALCOHOL: NEVER

## 2025-04-28 NOTE — ED PROVIDER NOTES
RELEASE CAPSULE    Take 1 capsule by mouth every morning for 30 days.    ARIPIPRAZOLE (ABILIFY) 10 MG TABLET    5 mg    BUPROPION (WELLBUTRIN) 100 MG TABLET    Take 1 tablet by mouth daily    HYDROXYZINE PAMOATE (VISTARIL) 50 MG CAPSULE    Take 1 capsule by mouth 3 times daily as needed for Itching    NAPROXEN (EC NAPROSYN) 500 MG EC TABLET    Take 1 tablet by mouth 2 times daily as needed for Pain    OMEPRAZOLE (PRILOSEC) 20 MG DELAYED RELEASE CAPSULE    Take 2 capsules by mouth daily    TRAZODONE (DESYREL) 100 MG TABLET    Take 1 tablet by mouth nightly    VILAZODONE HCL (VIIBRYD) 40 MG TABS    Take 1 tablet by mouth daily     ALLERGIES     is allergic to morphine, oxycodone-acetaminophen, reglan [metoclopramide], and zolpidem.  FAMILY HISTORY     She indicated that her mother is alive. She indicated that her father is . She indicated that her paternal grandfather is . She indicated that both of her daughters are alive.     SOCIAL HISTORY      reports that she quit smoking about 4 years ago. Her smoking use included cigarettes. She has never used smokeless tobacco. She reports current alcohol use. She reports that she does not use drugs.  PHYSICAL EXAM     INITIAL VITALS: /77   Pulse 100   Temp 98.2 °F (36.8 °C) (Oral)   Resp 19   Ht 1.6 m (5' 3\")   Wt 72.6 kg (160 lb)   LMP  (LMP Unknown)   SpO2 99%   BMI 28.34 kg/m²      General: NAD  Head: Normocephalic  Eyes: No scleral icterus  ENT: No dry mucus membranes  Neck: Supple  Lungs: Clear to ascultation bilaterally, no wheeze, no rales, no rhonchi  Cardiac: Regular rate and rhythm, S1/S2, no murmurs  Abdominal: Soft, nondistended, nontender, no rebound, no guarding.  Extremities: No edema  Rectal: Deferred  Genitourinary: Deferred  Skin: No rash  Neuro: AOx4, no decreased LOC  Psych: No anxiety  Perfusion: Warm x 4      MEDICAL DECISION MAKING:   MDM  Patient called EMS secondary to elevated heart rate.  Patient was found to be in SVT

## 2025-04-28 NOTE — ED NOTES
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

## 2025-04-28 NOTE — ED NOTES
Patient stated she does not want to finish fluids she is ready to go home. Fluids Dcd, 300mls in of 1000mls.

## 2025-04-28 NOTE — ED NOTES
Provided patient with warm blanket. Patient denies discomfort at this time. Resting comfortably in bed with eyes open, lights dimmed for comfort.

## 2025-04-28 NOTE — FLOWSHEET NOTE
Patient arrived by squad, per EMS patient called d/t not feeling right this morning, felt like her heart was racing. Upon 12 lead with squad patient was in SVT and cardioverted prior to arrival.

## 2025-04-29 LAB
EKG ATRIAL RATE: 96 BPM
EKG P AXIS: 79 DEGREES
EKG P-R INTERVAL: 142 MS
EKG Q-T INTERVAL: 346 MS
EKG QRS DURATION: 86 MS
EKG QTC CALCULATION (BAZETT): 437 MS
EKG R AXIS: 84 DEGREES
EKG T AXIS: 66 DEGREES
EKG VENTRICULAR RATE: 96 BPM

## 2025-04-29 PROCEDURE — 93010 ELECTROCARDIOGRAM REPORT: CPT | Performed by: INTERNAL MEDICINE
